# Patient Record
Sex: FEMALE | Race: WHITE | NOT HISPANIC OR LATINO | Employment: FULL TIME | URBAN - METROPOLITAN AREA
[De-identification: names, ages, dates, MRNs, and addresses within clinical notes are randomized per-mention and may not be internally consistent; named-entity substitution may affect disease eponyms.]

---

## 2024-05-06 NOTE — PROGRESS NOTES
"Chief Complaint  Establish Care    Subjective        HPI   Becka presents to [facility] for a new pt visit. Works in [employer].     Diabetes screening: Today  Lipid screening: Today  Lung cancer screening: Never smoker  Colon cancer screening: Referred for C-scope by gyn, has been putting off. Nervous about procedure but is open to referral. Would prefer that over cologuard.   Breast cancer screening: Gyn at Dr. Martell Bhatt  Cervical cancer screening: Gyn at Dr. Martell Bhatt  On HRT per her gyn  Bone density screening: Age 67  Discussed limiting or avoidance of alcohol  Does not use tobacco  Discussed recommendation for yearly eye and dental exams  Vaccines: States she RSV vaccine last fall although I cannot find record of it.   Diet and exercise habits discussed  FH: No FH cardiac disease  Mother had breast cancer, cervical cancer  No colon cancer in the family    Separate from physical/wellness visit, we discussed the following:    Issues with Oxybutynin, severe dry mouth to the point it was painful  Tried some sort of dry mouth remedy but did not help  Myrbetric also causing dry mouth, tolerable  However, she then started having diarrhea and acid reflux on it  Stopped taking it, acid reflux went away, stopped it about a week ago  Gyn was prescribing this for urge urinary incontinence, has daily sxs, huge annoyance  Never has seen urogyn    GERD sxs: Reflux sxs off PPI  No dysphagia  2-3 drinks of wine per day  No NSAIDs    Requesting PT referral for knee issues, getting a little worse lately, no injury, more stiffness  Would like an exercise program  Does not have an orthopedist currently  Knows she has OA  Walks regularly      Objective   Vital Signs:  Vitals:    05/13/24 1126   BP: 132/82   BP Location: Left arm   Patient Position: Sitting   Cuff Size: Adult   Pulse: 90   SpO2: 99%   Weight: 84.2 kg (185 lb 9.6 oz)   Height: 172.7 cm (68\")          Physical " Exam  Constitutional:       General: She is not in acute distress.     Appearance: Normal appearance. She is not ill-appearing or toxic-appearing.   HENT:      Head: Normocephalic and atraumatic.      Right Ear: Tympanic membrane and ear canal normal.      Left Ear: Tympanic membrane and ear canal normal.      Mouth/Throat:      Mouth: Mucous membranes are moist.      Pharynx: Oropharynx is clear. No oropharyngeal exudate or posterior oropharyngeal erythema.   Eyes:      General: No scleral icterus.        Right eye: No discharge.         Left eye: No discharge.      Conjunctiva/sclera: Conjunctivae normal.   Cardiovascular:      Rate and Rhythm: Normal rate and regular rhythm.      Heart sounds: Normal heart sounds. No murmur heard.     No friction rub. No gallop.   Pulmonary:      Effort: Pulmonary effort is normal. No respiratory distress.      Breath sounds: Normal breath sounds. No wheezing or rhonchi.   Musculoskeletal:         General: No swelling, tenderness or deformity. Normal range of motion.      Cervical back: Normal range of motion and neck supple. No rigidity.   Lymphadenopathy:      Cervical: No cervical adenopathy.   Skin:     General: Skin is warm and dry.      Coloration: Skin is not jaundiced or pale.      Findings: No lesion or rash.   Neurological:      General: No focal deficit present.      Mental Status: She is alert and oriented to person, place, and time.   Psychiatric:         Mood and Affect: Mood normal.         Behavior: Behavior normal.         Judgment: Judgment normal.          Result Review :     The following data was reviewed by: Maria Luz Del Toro MD on 05/13/2024:  MAMMO SCREENING DIGITAL TOMOSYNTHESIS BILATERAL W CAD (10/25/2022 09:53)  DXA Bone Density Screening (11/17/2022 17:13)         Assessment and Plan    Diagnoses and all orders for this visit:    1. Annual physical exam (Primary)    2. Routine health maintenance  Assessment & Plan:  Diabetes screening: Today  Lipid  screening: Today  Lung cancer screening: Never smoker  Colon cancer screening: Referred for C-scope by gyn, has been putting off. Nervous about procedure but is open to referral. Would prefer C-scope over Cologuard.   Breast cancer screening: Gyn at Dr. Martell Bhatt  Cervical cancer screening: Gyn at Dr. Martell Bhatt  Bone density screening: Age 67  Discussed limiting or avoidance of alcohol  Does not use tobacco  Discussed recommendation for yearly eye and dental exams  Vaccines: States she RSV vaccine last fall although I cannot find record of it. Otherwise vaccines are UTD  FH: No FH cardiac disease  Mother had breast cancer, cervical cancer  No colon cancer in the family    Orders:  -     CBC (No Diff)  -     Comprehensive Metabolic Panel    3. Encounter for hepatitis C screening test for low risk patient  -     Hepatitis C Antibody    4. Screening for diabetes mellitus (DM)  -     Hemoglobin A1c    5. Screening for lipid disorders  -     Lipid Panel    6. Gastroesophageal reflux disease, unspecified whether esophagitis present  Assessment & Plan:  CBC today  Con't PPI as breakthrough sxs off PPI  No NSAIDs  Advised that cutting down on ETOH would likely be very helpful    Orders:  -     CBC (No Diff)    7. Primary hypertension  Assessment & Plan:  On Lisinopril, controlled  Check renal function and K today    Orders:  -     Comprehensive Metabolic Panel    8. Other hyperlipidemia  Assessment & Plan:  Lipid panel today  ASCVD 10 year risk 5.1% based on last set of labs  No FH ASCVD as far as pt knows    Orders:  -     Lipid Panel    9. Urge incontinence  Assessment & Plan:  Huge QOL issue for pt  Unable to tolerate Oxybutynin and Mybetriq due to side effects  Recommended urogyn consult for discussion of non-medication options to manage    Orders:  -     Ambulatory Referral to Gynecologic Urology    10. Screening for colon cancer  -     Ambulatory Referral For Screening Colonoscopy    11. Chronic pain of  right knee  Assessment & Plan:  States she has known OA, pain not debilitating to point she feels she needs ortho but would like to see PT. Referred. Declines x-ray today.     Orders:  -     Ambulatory Referral to Physical Therapy    12. Daily consumption of alcohol  Assessment & Plan:  Denies any adverse outcomes from this, no hx withdrawal, very much enjoys wine  Counseled pt that per USPSTF guidelines, recommendation is no more than 7 alcoholic drinks per week for women  Offered her resources for cutting down if she ever feels she needs them (does not feel she needs now)  CMP today      13. Seasonal allergies  Assessment & Plan:  Flonase and Zyrtec          Follow Up   Return in about 6 months (around 11/13/2024) for Recheck, Next scheduled follow up.  Patient was given instructions and counseling regarding her condition or for health maintenance advice. Please see specific information pulled into the AVS if appropriate.

## 2024-05-13 ENCOUNTER — OFFICE VISIT (OUTPATIENT)
Dept: FAMILY MEDICINE CLINIC | Facility: CLINIC | Age: 64
End: 2024-05-13
Payer: COMMERCIAL

## 2024-05-13 ENCOUNTER — PATIENT ROUNDING (BHMG ONLY) (OUTPATIENT)
Dept: FAMILY MEDICINE CLINIC | Facility: CLINIC | Age: 64
End: 2024-05-13
Payer: COMMERCIAL

## 2024-05-13 VITALS
OXYGEN SATURATION: 99 % | HEART RATE: 90 BPM | HEIGHT: 68 IN | DIASTOLIC BLOOD PRESSURE: 82 MMHG | BODY MASS INDEX: 28.13 KG/M2 | SYSTOLIC BLOOD PRESSURE: 132 MMHG | WEIGHT: 185.6 LBS

## 2024-05-13 DIAGNOSIS — Z13.220 SCREENING FOR LIPID DISORDERS: ICD-10-CM

## 2024-05-13 DIAGNOSIS — Z11.59 ENCOUNTER FOR HEPATITIS C SCREENING TEST FOR LOW RISK PATIENT: ICD-10-CM

## 2024-05-13 DIAGNOSIS — K21.9 GASTROESOPHAGEAL REFLUX DISEASE, UNSPECIFIED WHETHER ESOPHAGITIS PRESENT: ICD-10-CM

## 2024-05-13 DIAGNOSIS — J30.2 SEASONAL ALLERGIES: ICD-10-CM

## 2024-05-13 DIAGNOSIS — G89.29 CHRONIC PAIN OF RIGHT KNEE: ICD-10-CM

## 2024-05-13 DIAGNOSIS — Z00.00 ANNUAL PHYSICAL EXAM: Primary | ICD-10-CM

## 2024-05-13 DIAGNOSIS — M25.561 CHRONIC PAIN OF RIGHT KNEE: ICD-10-CM

## 2024-05-13 DIAGNOSIS — N39.41 URGE INCONTINENCE: ICD-10-CM

## 2024-05-13 DIAGNOSIS — Z00.00 ROUTINE HEALTH MAINTENANCE: ICD-10-CM

## 2024-05-13 DIAGNOSIS — E78.49 OTHER HYPERLIPIDEMIA: ICD-10-CM

## 2024-05-13 DIAGNOSIS — Z13.1 SCREENING FOR DIABETES MELLITUS (DM): ICD-10-CM

## 2024-05-13 DIAGNOSIS — Z78.9 DAILY CONSUMPTION OF ALCOHOL: ICD-10-CM

## 2024-05-13 DIAGNOSIS — Z12.11 SCREENING FOR COLON CANCER: ICD-10-CM

## 2024-05-13 DIAGNOSIS — I10 PRIMARY HYPERTENSION: ICD-10-CM

## 2024-05-13 PROCEDURE — 99214 OFFICE O/P EST MOD 30 MIN: CPT | Performed by: INTERNAL MEDICINE

## 2024-05-13 PROCEDURE — 99396 PREV VISIT EST AGE 40-64: CPT | Performed by: INTERNAL MEDICINE

## 2024-05-13 RX ORDER — CETIRIZINE HYDROCHLORIDE 10 MG/1
10 TABLET ORAL DAILY
COMMUNITY

## 2024-05-13 RX ORDER — FLUTICASONE PROPIONATE 50 MCG
2 SPRAY, SUSPENSION (ML) NASAL DAILY
COMMUNITY

## 2024-05-13 RX ORDER — CYCLOSPORINE 0.5 MG/ML
EMULSION OPHTHALMIC
COMMUNITY
Start: 2024-03-20

## 2024-05-13 RX ORDER — MIRABEGRON 25 MG/1
TABLET, FILM COATED, EXTENDED RELEASE ORAL
COMMUNITY
Start: 2024-04-01 | End: 2024-05-13

## 2024-05-13 NOTE — ASSESSMENT & PLAN NOTE
CBC today  Con't PPI as breakthrough sxs off PPI  No NSAIDs  Advised that cutting down on ETOH would likely be very helpful

## 2024-05-13 NOTE — ASSESSMENT & PLAN NOTE
Lipid panel today  ASCVD 10 year risk 5.1% based on last set of labs  No FH ASCVD as far as pt knows

## 2024-05-13 NOTE — ASSESSMENT & PLAN NOTE
Diabetes screening: Today  Lipid screening: Today  Lung cancer screening: Never smoker  Colon cancer screening: Referred for C-scope by gyn, has been putting off. Nervous about procedure but is open to referral. Would prefer C-scope over Cologuard.   Breast cancer screening: Gyn at Dr. Martell Bhatt  Cervical cancer screening: Gyn at Dr. Martell Bhatt  Bone density screening: Age 67  Discussed limiting or avoidance of alcohol  Does not use tobacco  Discussed recommendation for yearly eye and dental exams  Vaccines: States she RSV vaccine last fall although I cannot find record of it. Otherwise vaccines are UTD  FH: No FH cardiac disease  Mother had breast cancer, cervical cancer  No colon cancer in the family

## 2024-05-13 NOTE — ASSESSMENT & PLAN NOTE
States she has known OA, pain not debilitating to point she feels she needs ortho but would like to see PT. Referred. Declines x-ray today.

## 2024-05-13 NOTE — ASSESSMENT & PLAN NOTE
Huge QOL issue for pt  Unable to tolerate Oxybutynin and Mybetriq due to side effects  Recommended urogyn consult for discussion of non-medication options to manage   Patient notified of negative results. Will use coconut oil for dryness in that area.

## 2024-05-13 NOTE — ASSESSMENT & PLAN NOTE
Denies any adverse outcomes from this, no hx withdrawal, very much enjoys wine  Counseled pt that per USPSTF guidelines, recommendation is no more than 7 alcoholic drinks per week for women  Offered her resources for cutting down if she ever feels she needs them (does not feel she needs now)  CMP today

## 2024-05-13 NOTE — PROGRESS NOTES
A My-Chart message has been sent to the patient for PATIENT ROUNDING with [facility]

## 2024-05-14 DIAGNOSIS — I10 PRIMARY HYPERTENSION: Primary | ICD-10-CM

## 2024-05-14 DIAGNOSIS — K21.9 GASTROESOPHAGEAL REFLUX DISEASE, UNSPECIFIED WHETHER ESOPHAGITIS PRESENT: ICD-10-CM

## 2024-05-14 LAB
ALBUMIN SERPL-MCNC: 4.4 G/DL (ref 3.5–5.2)
ALBUMIN/GLOB SERPL: 1.7 G/DL
ALP SERPL-CCNC: 68 U/L (ref 39–117)
ALT SERPL-CCNC: 19 U/L (ref 1–33)
AST SERPL-CCNC: 25 U/L (ref 1–32)
BILIRUB SERPL-MCNC: 0.2 MG/DL (ref 0–1.2)
BUN SERPL-MCNC: 10 MG/DL (ref 8–23)
BUN/CREAT SERPL: 12.2 (ref 7–25)
CALCIUM SERPL-MCNC: 9.4 MG/DL (ref 8.6–10.5)
CHLORIDE SERPL-SCNC: 105 MMOL/L (ref 98–107)
CHOLEST SERPL-MCNC: 180 MG/DL (ref 0–200)
CO2 SERPL-SCNC: 27.9 MMOL/L (ref 22–29)
CREAT SERPL-MCNC: 0.82 MG/DL (ref 0.57–1)
EGFRCR SERPLBLD CKD-EPI 2021: 81.5 ML/MIN/1.73
ERYTHROCYTE [DISTWIDTH] IN BLOOD BY AUTOMATED COUNT: 12.8 % (ref 12.3–15.4)
GLOBULIN SER CALC-MCNC: 2.6 GM/DL
GLUCOSE SERPL-MCNC: 94 MG/DL (ref 65–99)
HBA1C MFR BLD: 5.4 % (ref 4.8–5.6)
HCT VFR BLD AUTO: 41.6 % (ref 34–46.6)
HCV IGG SERPL QL IA: NON REACTIVE
HDLC SERPL-MCNC: 55 MG/DL (ref 40–60)
HGB BLD-MCNC: 13.3 G/DL (ref 12–15.9)
LDLC SERPL CALC-MCNC: 101 MG/DL (ref 0–100)
MCH RBC QN AUTO: 29.8 PG (ref 26.6–33)
MCHC RBC AUTO-ENTMCNC: 32 G/DL (ref 31.5–35.7)
MCV RBC AUTO: 93.3 FL (ref 79–97)
PLATELET # BLD AUTO: 290 10*3/MM3 (ref 140–450)
POTASSIUM SERPL-SCNC: 4.4 MMOL/L (ref 3.5–5.2)
PROT SERPL-MCNC: 7 G/DL (ref 6–8.5)
RBC # BLD AUTO: 4.46 10*6/MM3 (ref 3.77–5.28)
SODIUM SERPL-SCNC: 144 MMOL/L (ref 136–145)
TRIGL SERPL-MCNC: 139 MG/DL (ref 0–150)
VLDLC SERPL CALC-MCNC: 24 MG/DL (ref 5–40)
WBC # BLD AUTO: 6.08 10*3/MM3 (ref 3.4–10.8)

## 2024-05-14 RX ORDER — LISINOPRIL 20 MG/1
20 TABLET ORAL DAILY
Qty: 90 TABLET | Refills: 3 | Status: SHIPPED | OUTPATIENT
Start: 2024-05-14

## 2024-05-14 RX ORDER — PANTOPRAZOLE SODIUM 20 MG/1
20 TABLET, DELAYED RELEASE ORAL DAILY
Qty: 90 TABLET | Refills: 3 | Status: SHIPPED | OUTPATIENT
Start: 2024-05-14

## 2024-06-05 ENCOUNTER — TREATMENT (OUTPATIENT)
Dept: PHYSICAL THERAPY | Facility: CLINIC | Age: 64
End: 2024-06-05
Payer: COMMERCIAL

## 2024-06-05 DIAGNOSIS — G89.29 CHRONIC PAIN OF RIGHT KNEE: Primary | ICD-10-CM

## 2024-06-05 DIAGNOSIS — M25.561 CHRONIC PAIN OF RIGHT KNEE: Primary | ICD-10-CM

## 2024-06-05 NOTE — PROGRESS NOTES
Physical Therapy Initial Evaluation and Plan of Care  [address], [address]  [place] [address]    Patient: Becka Stack   : 1960  Diagnosis/ICD-10 Code:  Chronic pain of right knee [M25.561, G89.29]  Referring practitioner: Maria Luz Del Toro MD  Date of Initial Visit: 2024  Today's Date: 2024  Patient seen for 1 session         Visit Diagnoses:    ICD-10-CM ICD-9-CM   1. Chronic pain of right knee  M25.561 719.46    G89.29 338.29         Subjective Questionnaire: LEFS: 53      Subjective Evaluation    History of Present Illness  Mechanism of injury: Patient is a 64 year old female who presents with right knee pain that has been bothering her since she was 21.  Reports that over the years it has gotten worse.  Denies any recent injuries to the knee.  Reports more stiffness in the knee.  Reports having PT quite a while ago.  Reports trying to walk a lot, which helps the pain in the knee.    Reports having difficulty with stairs.  Reports being unable to kneel and get up.  Denies any issues sleeping.    History of right knee scope in .      Patient Occupation: [employer]  Current pain ratin  At worst pain ratin  Location: right anterior knee  Quality: dull ache (stiffness)  Alleviating factors: walking.  Exacerbated by: sitting long periods.  Progression: worsening    Diagnostic Tests  X-ray: abnormal (arthritis)             Objective          Observations     Additional Knee Observation Details  Normal sit to stand.  Patient ambulates with a minimal antalgic gait pattern.    Palpation     Additional Palpation Details  TTP to the right medial joint line, inferior patella and superior medial patella.    Active Range of Motion     Right Knee   Flexion: 126 degrees   Extension: Right knee active extension: -15.     Strength/Myotome Testing     Left Knee   Flexion: 4+  Extension: 4+    Tests     Right Knee   Negative valgus stress test at 0 degrees, valgus stress test  at 30 degrees, varus stress test at 0 degrees and varus stress test at 30 degrees.           Assessment & Plan       Assessment  Impairments: abnormal gait, abnormal or restricted ROM, activity intolerance, lacks appropriate home exercise program and pain with function   Assessment details: Patient is a 64 year old female who presents with c/o pain, TTP, limited right knee AROM and an antalgic gait pattern.  Barriers to therapy: none  Prognosis: good  Prognosis details: STG's to be met by 2 weeks  1)  Independent with HEP to show compliance  2)  Decrease pain by 50% or more to allow patient to perform self care and activities more comfortably  3)  AROM right knee flexion to 130 for improved mobility with gait and stairs  4)  AROM right knee extension to -8 for improved heel strike during the gait cycle    LTG's to be met by 4 weeks  1)  Independent with HEP progression to show continued compliance  2)  Decrease pain by 75% or more to allow patient to return to activities and hobbies with minimal limitations   3)  No TTP present to indicate improved healing response  4)  Patient to ambulate with a normal gait pattern to allow for improved mobility in the house, stores, etc      Plan  Therapy options: will be seen for skilled therapy services  Planned therapy interventions: strengthening, stretching, therapeutic activities, home exercise program, gait training and neuromuscular re-education  Frequency: 2x week  Duration in weeks: 4  Treatment plan discussed with: patient            Timed:         Manual Therapy:    0     mins  63728;     Therapeutic Exercise:    18     mins  97763;     Neuromuscular Chelsea:    0    mins  96079;    Therapeutic Activity:     8     mins  14345;     Gait Trainin     mins  69883;     Ultrasound:     0     mins  01854;          Un-Timed:  Electrical Stimulation:    0     mins  78181 ( );    Low Eval     20     Mins  39256  Mod Eval     0     Mins  20709  High Eval                        0     Mins  89163        Timed Treatment:   26   mins   Total Treatment:     46   mins          PT: Carlos Eduardo Mcfarlane, PT     [place] License [id number]  Electronically signed by Carlos Eduardo Mcfarlane PT, 06/05/24, 3:13 PM EDT    Certification Period: 6/5/2024 thru 9/2/2024  I certify that the therapy services are furnished while this patient is under my care.  The services outlined above are required by this patient, and will be reviewed every 90 days.    Maria Luz Elizondo Md  [facility] [place] [address]   NPI: [id number]      Carlos Eduardo Mcfarlane PT   License number: [id number]        Physician Signature:__________________________________________________    PHYSICIAN: Maria Luz Elizondo MD      DATE:     Please sign and return via fax to [email or web address] . Thank you, [facility].

## 2024-06-19 ENCOUNTER — TREATMENT (OUTPATIENT)
Dept: PHYSICAL THERAPY | Facility: CLINIC | Age: 64
End: 2024-06-19
Payer: COMMERCIAL

## 2024-06-19 DIAGNOSIS — G89.29 CHRONIC PAIN OF RIGHT KNEE: Primary | ICD-10-CM

## 2024-06-19 DIAGNOSIS — M25.561 CHRONIC PAIN OF RIGHT KNEE: Primary | ICD-10-CM

## 2024-06-19 NOTE — PROGRESS NOTES
Physical Therapy Daily Treatment Note  [address], [address]  [place] [address]      Patient: Becka Stack   : 1960  Referring practitioner: Maria Luz Del Toro MD  Date of Initial Visit: Type: THERAPY  Noted: 2024  Today's Date: 2024  Patient seen for 2 sessions       Visit Diagnoses:    ICD-10-CM ICD-9-CM   1. Chronic pain of right knee  M25.561 719.46    G89.29 338.29           Subjective   Patient reports that she was doing pretty good until she tried the elliptical, which aggravated it.  Currently rates the pain at 2/10.    Objective   See Exercise, Manual, and Modality Logs for complete treatment.   Added NuStep and bridges.    Assessment/Plan  Subjective reports are slightly increased from the previous visit (no pain).  Added NuStep for strength and endurance.  Added bridges for glut and hamstring strength which will help going up stairs.  Added KT to the knee to help the S/S's.  Patient tolerated the progression of open and closed chain activities very well, no reports of pain with the routine.      Timed:         Manual Therapy:    0     mins  14248;     Therapeutic Exercise:    16     mins  09184;     Neuromuscular Chelsea:    8    mins  92051;    Therapeutic Activity:     8     mins  99989;     Gait Training      0    mins  98394;  Work Conditioning     0   mins  93261       Untimed:  Electrical Stimulation:    0     mins  48601 ( );      Timed Treatment:   32   mins   Total Treatment:     32   mins    Carlos Eduardo Mcfarlane, PT  [place] License: [id number]

## 2024-06-25 ENCOUNTER — TREATMENT (OUTPATIENT)
Dept: PHYSICAL THERAPY | Facility: CLINIC | Age: 64
End: 2024-06-25
Payer: COMMERCIAL

## 2024-06-25 DIAGNOSIS — G89.29 CHRONIC PAIN OF RIGHT KNEE: Primary | ICD-10-CM

## 2024-06-25 DIAGNOSIS — M25.561 CHRONIC PAIN OF RIGHT KNEE: Primary | ICD-10-CM

## 2024-06-25 NOTE — PROGRESS NOTES
Physical Therapy Daily Treatment Note  [facility]   [address], [address]  [place] [address]  P: [phone]       F: [phone]    Patient: Becka Stack   : 1960  Diagnosis/ICD-10 Code:  Chronic pain of right knee [M25.561, G89.29]  Referring practitioner: Maria Luz Del Toro MD  Date of Initial Visit: Type: THERAPY  Noted: 2024  Today's Date: 2024  Patient seen for 3 sessions       Becka Stack reports: R knee is better today versus last week. I didn't have anything at home to take the tape off so it is still on. I think I want to go a week and see how I do without the tape.     Subjective     Objective   See Exercise, Manual, and Modality Logs for complete treatment.       Assessment/Plan  Subjectively, pt reports no increase of pain or discomfort with interventions performed today. Performed well with continued R knee mobility and strengthening interventions. Continues to demonstrate minimal tolerance to added CKC strengthening with squats with fear of the exercise. Continues to benefit from verbal/tactile cues to ensure proper form and technique for exercise performance.     Progress per Plan of Care           Manual Therapy:         mins  69570;  Therapeutic Exercise:    15     mins  82989;     Neuromuscular Chelsea:    10    mins  98075;    Therapeutic Activity:     10     mins  61810;     Gait Training:           mins  34656;     Ultrasound:          mins  36872;    Electrical Stimulation:         mins  59329;  Traction          mins 02499    Timed Treatment:   35   mins   Total Treatment:     35   mins    Taylor Haji PTA  Physical Therapist Assistant [id number]

## 2024-07-15 ENCOUNTER — TREATMENT (OUTPATIENT)
Dept: PHYSICAL THERAPY | Facility: CLINIC | Age: 64
End: 2024-07-15
Payer: COMMERCIAL

## 2024-07-15 ENCOUNTER — DOCUMENTATION (OUTPATIENT)
Dept: PHYSICAL THERAPY | Facility: CLINIC | Age: 64
End: 2024-07-15

## 2024-07-15 DIAGNOSIS — G89.29 CHRONIC PAIN OF RIGHT KNEE: Primary | ICD-10-CM

## 2024-07-15 DIAGNOSIS — M25.561 CHRONIC PAIN OF RIGHT KNEE: Primary | ICD-10-CM

## 2024-07-15 NOTE — PROGRESS NOTES
Physical Therapy Daily Treatment Note  [address], [address]  [place] [address]      Patient: Becka Stack   : 1960  Referring practitioner: Maria Luz Del Toro MD  Date of Initial Visit: Type: THERAPY  Noted: 2024  Today's Date: 7/15/2024  Patient seen for 4 sessions       Visit Diagnoses:    ICD-10-CM ICD-9-CM   1. Chronic pain of right knee  M25.561 719.46    G89.29 338.29           Subjective   Patient reports that the knee has been doing good; currently denies pain.    Objective          Active Range of Motion     Right Knee   Flexion: 125 degrees   Extension: Right knee active extension: -16.     Strength/Myotome Testing     Right Knee   Flexion: 5  Extension: 4+      See Exercise, Manual, and Modality Logs for complete treatment.       Assessment/Plan  Subjective reports have improved since beginning PT.  Strength has improved since starting PT.  Feel that the patient can continue with the HEP at this time and she was agreeable with this.  Patient performed the exercise routine without visual or verbal signs of pain in the knee.      Timed:         Manual Therapy:    0     mins  29765;     Therapeutic Exercise:    25     mins  91182;     Neuromuscular Chelsea:    0    mins  33606;    Therapeutic Activity:     8     mins  92054;     Gait Training      0    mins  42700;  Work Conditioning     0   mins  18065       Untimed:  Electrical Stimulation:    0     mins  23821 ( );      Timed Treatment:   33   mins   Total Treatment:     33   mins    Carlos Eduardo Mcfarlane, PT  [place] License: [id number]

## 2024-10-29 ENCOUNTER — PREP FOR SURGERY (OUTPATIENT)
Dept: OTHER | Facility: HOSPITAL | Age: 64
End: 2024-10-29
Payer: COMMERCIAL

## 2024-10-29 DIAGNOSIS — Z12.11 SCREENING FOR COLON CANCER: Primary | ICD-10-CM

## 2024-11-13 ENCOUNTER — OFFICE VISIT (OUTPATIENT)
Dept: FAMILY MEDICINE CLINIC | Facility: CLINIC | Age: 64
End: 2024-11-13
Payer: COMMERCIAL

## 2024-11-13 VITALS
BODY MASS INDEX: 27.74 KG/M2 | SYSTOLIC BLOOD PRESSURE: 146 MMHG | DIASTOLIC BLOOD PRESSURE: 94 MMHG | WEIGHT: 183 LBS | HEART RATE: 88 BPM | TEMPERATURE: 98.3 F | RESPIRATION RATE: 16 BRPM | HEIGHT: 68 IN | OXYGEN SATURATION: 98 %

## 2024-11-13 DIAGNOSIS — I10 PRIMARY HYPERTENSION: Primary | ICD-10-CM

## 2024-11-13 DIAGNOSIS — K21.9 GASTROESOPHAGEAL REFLUX DISEASE, UNSPECIFIED WHETHER ESOPHAGITIS PRESENT: ICD-10-CM

## 2024-11-13 DIAGNOSIS — R73.09 ABNORMAL GLUCOSE: ICD-10-CM

## 2024-11-13 DIAGNOSIS — Z00.00 ANNUAL PHYSICAL EXAM: ICD-10-CM

## 2024-11-13 DIAGNOSIS — E78.49 OTHER HYPERLIPIDEMIA: ICD-10-CM

## 2024-11-13 PROCEDURE — 99214 OFFICE O/P EST MOD 30 MIN: CPT | Performed by: STUDENT IN AN ORGANIZED HEALTH CARE EDUCATION/TRAINING PROGRAM

## 2024-11-13 RX ORDER — TRIAMCINOLONE ACETONIDE 5 MG/G
CREAM TOPICAL
COMMUNITY
End: 2024-11-13

## 2024-11-13 RX ORDER — VIBEGRON 75 MG/1
75 TABLET, FILM COATED ORAL DAILY
COMMUNITY
Start: 2024-06-05 | End: 2024-12-30

## 2024-11-13 RX ORDER — PANTOPRAZOLE SODIUM 40 MG/1
40 TABLET, DELAYED RELEASE ORAL DAILY
Qty: 90 TABLET | Refills: 3 | Status: SHIPPED | OUTPATIENT
Start: 2024-11-13

## 2024-11-13 RX ORDER — LISINOPRIL 20 MG/1
20 TABLET ORAL DAILY
Qty: 90 TABLET | Refills: 3 | Status: SHIPPED | OUTPATIENT
Start: 2024-11-13

## 2024-11-19 ENCOUNTER — PATIENT MESSAGE (OUTPATIENT)
Dept: FAMILY MEDICINE CLINIC | Facility: CLINIC | Age: 64
End: 2024-11-19
Payer: COMMERCIAL

## 2024-11-19 LAB
ALBUMIN SERPL-MCNC: 4.3 G/DL (ref 3.5–5.2)
ALBUMIN/GLOB SERPL: 1.5 G/DL
ALP SERPL-CCNC: 59 U/L (ref 39–117)
ALT SERPL-CCNC: 13 U/L (ref 1–33)
AST SERPL-CCNC: 22 U/L (ref 1–32)
BILIRUB SERPL-MCNC: 0.4 MG/DL (ref 0–1.2)
BUN SERPL-MCNC: 14 MG/DL (ref 8–23)
BUN/CREAT SERPL: 17.7 (ref 7–25)
CALCIUM SERPL-MCNC: 9.3 MG/DL (ref 8.6–10.5)
CHLORIDE SERPL-SCNC: 105 MMOL/L (ref 98–107)
CHOLEST SERPL-MCNC: 222 MG/DL (ref 0–200)
CHOLEST/HDLC SERPL: 3.31 {RATIO}
CO2 SERPL-SCNC: 26.1 MMOL/L (ref 22–29)
CREAT SERPL-MCNC: 0.79 MG/DL (ref 0.57–1)
EGFRCR SERPLBLD CKD-EPI 2021: 84.7 ML/MIN/1.73
GLOBULIN SER CALC-MCNC: 2.9 GM/DL
GLUCOSE SERPL-MCNC: 90 MG/DL (ref 65–99)
HDLC SERPL-MCNC: 67 MG/DL (ref 40–60)
LDLC SERPL CALC-MCNC: 131 MG/DL (ref 0–100)
POTASSIUM SERPL-SCNC: 4.7 MMOL/L (ref 3.5–5.2)
PROT SERPL-MCNC: 7.2 G/DL (ref 6–8.5)
SODIUM SERPL-SCNC: 140 MMOL/L (ref 136–145)
TRIGL SERPL-MCNC: 135 MG/DL (ref 0–150)
VLDLC SERPL CALC-MCNC: 24 MG/DL (ref 5–40)

## 2024-11-19 NOTE — PROGRESS NOTES
"Chief Complaint  Establish Care, Hypertension (LEFT, 146/94.), Heartburn, and Arthritis    Subjective    History of Present Illness  The patient is a 64-year-old female who is here as a new patient for establishing care.    She has a history of hypertension, which was previously managed with lisinopril. Her blood pressure was well-controlled during her last visit in 05/2024. However, she does not regularly monitor her blood pressure at home and has not done so in the past two weeks.    She also has a history of cholesterol issues but is not currently on any medication for it.    She has experienced urge incontinence and was referred to a urogynecologist, Dr. Rosamaria Pérez, who recommended a different medication that has been more effective. She is currently on Gemtesa. She has undergone a bladder scan, urine testing, culture, and vaginosis panel. A vaginal swab was also taken.    She had a biopsy of the inner lip of her vulvar area, which confirmed a diagnosis of lichen sclerosis with hyperkeratosis. This condition has improved significantly.    She reports no nausea, vomiting, abdominal pain, or leg swelling.       Becka Stack presents to [facility]  Hypertension    Heartburn    Arthritis        Objective   Vital Signs:  /94 (BP Location: Left arm, Patient Position: Sitting, Cuff Size: Adult)   Pulse 88   Temp 98.3 °F (36.8 °C) (Oral)   Resp 16   Ht 172.7 cm (68\")   Wt 83 kg (183 lb)   SpO2 98%   BMI 27.83 kg/m²   Estimated body mass index is 27.83 kg/m² as calculated from the following:    Height as of this encounter: 172.7 cm (68\").    Weight as of this encounter: 83 kg (183 lb).            Physical Exam  Cardiovascular:      Rate and Rhythm: Normal rate.      Pulses: Normal pulses.      Heart sounds: Normal heart sounds.   Pulmonary:      Effort: Pulmonary effort is normal.      Breath sounds: Normal breath sounds.   Abdominal:      General: Bowel sounds are normal. "      Palpations: Abdomen is soft.   Skin:     General: Skin is warm.   Neurological:      Mental Status: She is alert and oriented to person, place, and time.        Result Review :  The following data was reviewed by: Arnav Wolff MD on 11/13/2024:  CMP          5/13/2024    12:19 11/18/2024    09:41   CMP   Glucose 94  90    BUN 10  14    Creatinine 0.82  0.79    Sodium 144  140    Potassium 4.4  4.7    Chloride 105  105    Calcium 9.4  9.3    Total Protein 7.0  7.2    Albumin 4.4  4.3    Globulin 2.6  2.9    Total Bilirubin 0.2  0.4    Alkaline Phosphatase 68  59    AST (SGOT) 25  22    ALT (SGPT) 19  13    BUN/Creatinine Ratio 12.2  17.7      CBC          5/13/2024    12:19   CBC   WBC 6.08    RBC 4.46    Hemoglobin 13.3    Hematocrit 41.6    MCV 93.3    MCH 29.8    MCHC 32.0    RDW 12.8    Platelets 290      Lipid Panel          5/13/2024    12:19 11/18/2024    09:41   Lipid Panel   Total Cholesterol 180  222    Triglycerides 139  135    HDL Cholesterol 55  67    VLDL Cholesterol 24  24    LDL Cholesterol  101  131                  Assessment and Plan   Diagnoses and all orders for this visit:    1. Primary hypertension (Primary)  -     Comprehensive Metabolic Panel  -     Comprehensive Metabolic Panel; Future  -     lisinopril (PRINIVIL,ZESTRIL) 20 MG tablet; Take 1 tablet by mouth Daily.  Dispense: 90 tablet; Refill: 3    2. Other hyperlipidemia  -     Lipid Panel With / Chol / HDL Ratio  -     Lipid Panel With / Chol / HDL Ratio; Future    3. Abnormal glucose  -     Hemoglobin A1c; Future    4. Annual physical exam  -     CBC Auto Differential; Future  -     Comprehensive Metabolic Panel; Future  -     Lipid Panel With / Chol / HDL Ratio; Future  -     TSH; Future  -     Urinalysis With Microscopic - Urine, Clean Catch; Future  -     Hemoglobin A1c; Future    5. Gastroesophageal reflux disease, unspecified whether esophagitis present  -     pantoprazole (Protonix) 40 MG EC tablet; Take 1 tablet by mouth  Daily.  Dispense: 90 tablet; Refill: 3        Assessment & Plan  1. Hypertension.  Her previous visit in May 2024 with Dr. Del Toro revealed controlled hypertension with a blood pressure reading of 132/82. However, today's reading indicates an elevated blood pressure of 146/94. She does not regularly check her blood pressure at home. It is not advisable to adjust her blood pressure medication based on a single reading. She will need to monitor her blood pressure readings at home for 4 weeks. If her readings are consistently below 135/85, she can continue her current medication regimen and schedule her next appointment in 6 months. If her readings are high, she will need to schedule an earlier appointment. A new prescription for lisinopril 20 mg will be sent to her pharmacy, [facility].    2. Hyperlipidemia.  She has a history of cholesterol issues with a risk factor of 5.1 percent. Her cholesterol levels were within normal range six months ago. Today's lab tests will focus on liver, kidney function, and cholesterol levels. She was not fasting today, which may affect her glucose and triglyceride levels. It is important to monitor her cholesterol levels due to her slightly elevated cholesterol. She is advised to schedule fasting labs within a month.    3. Urge Incontinence.  She has been managing urge incontinence with Gemtesa, which has shown improvement. Her skin condition has also improved since she stopped wearing pads and started using Aquaphor. A biopsy confirmed lichen sclerosus with hyperkeratosis, but her condition has improved significantly. She will continue with her current treatment.    4. Gastroesophageal Reflux Disease (GERD).  She requested a new prescription for pantoprazole 40 mg, which will be sent to her pharmacy, [facility].    Follow-up  Return in 6 months for follow-up.            Follow Up   No follow-ups on file.  Patient was given instructions and counseling regarding her condition or for health  maintenance advice. Please see specific information pulled into the AVS if appropriate.     Patient or patient representative verbalized consent for the use of Ambient Listening during the visit with  Arnav Wolff MD for chart documentation. 11/19/2024  04:57 EST

## 2025-06-13 ENCOUNTER — OFFICE VISIT (OUTPATIENT)
Dept: FAMILY MEDICINE CLINIC | Facility: CLINIC | Age: 65
End: 2025-06-13
Payer: COMMERCIAL

## 2025-06-13 VITALS
OXYGEN SATURATION: 97 % | DIASTOLIC BLOOD PRESSURE: 98 MMHG | SYSTOLIC BLOOD PRESSURE: 142 MMHG | HEART RATE: 88 BPM | TEMPERATURE: 98.4 F | RESPIRATION RATE: 16 BRPM | BODY MASS INDEX: 28.95 KG/M2 | HEIGHT: 68 IN | WEIGHT: 191 LBS

## 2025-06-13 DIAGNOSIS — E78.49 OTHER HYPERLIPIDEMIA: ICD-10-CM

## 2025-06-13 DIAGNOSIS — Z00.00 ANNUAL PHYSICAL EXAM: Primary | ICD-10-CM

## 2025-06-13 DIAGNOSIS — R73.09 ABNORMAL GLUCOSE: ICD-10-CM

## 2025-06-13 DIAGNOSIS — I10 PRIMARY HYPERTENSION: ICD-10-CM

## 2025-06-13 DIAGNOSIS — K21.9 GASTROESOPHAGEAL REFLUX DISEASE, UNSPECIFIED WHETHER ESOPHAGITIS PRESENT: ICD-10-CM

## 2025-06-13 PROCEDURE — 99396 PREV VISIT EST AGE 40-64: CPT | Performed by: STUDENT IN AN ORGANIZED HEALTH CARE EDUCATION/TRAINING PROGRAM

## 2025-06-13 RX ORDER — VIBEGRON 75 MG/1
75 TABLET, FILM COATED ORAL DAILY
COMMUNITY
Start: 2024-11-26 | End: 2025-11-21

## 2025-06-13 NOTE — PROGRESS NOTES
"Chief Complaint  Annual Exam    Subjective    History of Present Illness  The patient is a 65-year-old female who presents for a physical exam.    She reports overall good health, with the exception of work-related stress due to recent changes in her environment. She has not been monitoring her blood pressure at home but believes it to be within normal limits. She is currently on a daily regimen of lisinopril 20 mg. Previous blood pressure readings include 146/94 six months ago and 132/82 prior to that.    She has a history of overactive bladder, which is currently managed with Gemtesa. She also experienced a skin condition related to moisture, which has since improved.    She is postmenopausal and is taking hormonal pills daily, including Econole, estradiol, and progesterone. Her last OB/GYN visit was in 01/2025, during which a Pap smear was performed. She is scheduled for a Pap smear every 3 years. She is due for colon cancer screening and has completed the necessary paperwork. She has expressed a preference for Dr. Rojelio Freire to perform the procedure.    She recently underwent a hearing test, which showed no changes. She occasionally experiences throat irritation due to allergies but reports no current symptoms. She does not use tobacco products. She reports no abdominal pain, open wounds on her feet, or sensations of numbness or tingling.    SOCIAL HISTORY  She does not smoke cigarettes.       Becka Stack presents to [facility]  History of Present Illness    Objective   Vital Signs:  /98 (BP Location: Left arm, Patient Position: Sitting, Cuff Size: Adult)   Pulse 88   Temp 98.4 °F (36.9 °C) (Oral)   Resp 16   Ht 172.7 cm (67.99\")   Wt 86.6 kg (191 lb)   SpO2 97%   BMI 29.05 kg/m²   Estimated body mass index is 29.05 kg/m² as calculated from the following:    Height as of this encounter: 172.7 cm (67.99\").    Weight as of this encounter: 86.6 kg (191 lb).    BMI is " >= 25 and <30. (Overweight) The following options were offered after discussion;: exercise counseling/recommendations and nutrition counseling/recommendations      Physical Exam  Cardiovascular:      Rate and Rhythm: Normal rate.      Pulses: Normal pulses.      Heart sounds: Normal heart sounds.   Pulmonary:      Effort: Pulmonary effort is normal.      Breath sounds: Normal breath sounds.   Abdominal:      General: Bowel sounds are normal.      Palpations: Abdomen is soft.   Neurological:      Mental Status: She is alert and oriented to person, place, and time.        Result Review :  The following data was reviewed by: Arnav Wolff MD on 06/13/2025:  CMP          11/18/2024    09:41 5/28/2025    09:17   CMP   Glucose 90  93    BUN 14  15.0    Creatinine 0.79  0.88    EGFR 84.7  74.4    Sodium 140  139    Potassium 4.7  4.9    Chloride 105  103    Calcium 9.3  9.3    Total Protein 7.2  7.1    Albumin 4.3  4.3    Globulin 2.9  2.8    Total Bilirubin 0.4  0.5    Alkaline Phosphatase 59  54    AST (SGOT) 22  23    ALT (SGPT) 13  17    Albumin/Globulin Ratio 1.5  1.5    BUN/Creatinine Ratio 17.7  17.0      CBC          5/28/2025    09:17   CBC   WBC 6.33    RBC 4.38    Hemoglobin 13.5    Hematocrit 40.9    MCV 93.4    MCH 30.8    MCHC 33.0    RDW 12.6    Platelets 281      Lipid Panel          11/18/2024    09:41 5/28/2025    09:17   Lipid Panel   Total Cholesterol 222  233    Triglycerides 135  168    HDL Cholesterol 67  72    VLDL Cholesterol 24  29    LDL Cholesterol  131  132      TSH          5/28/2025    09:17   TSH   TSH 3.220                Assessment and Plan   Diagnoses and all orders for this visit:    1. Annual physical exam (Primary)    2. Primary hypertension    3. Other hyperlipidemia    4. Abnormal glucose    5. Gastroesophageal reflux disease, unspecified whether esophagitis present        Assessment & Plan  1. Hypertension.  - Blood pressure readings have been consistently elevated, with today's  reading at 152/94 and a previous reading of 146/94 six months ago.  - Increased pain and limited mobility.  - Advised to monitor blood pressure daily for the next 4 weeks, ensuring relaxation during each reading. If more than half of the readings exceed 135/85, consider these as abnormal and schedule a follow-up appointment.  - Continue current medication regimen of lisinopril 20 mg daily. Bring a record of blood pressure readings for the two weeks preceding the next appointment in 6 months.    2. Overactive bladder.  - Currently on Gemtesa for overactive bladder with improved symptoms.  - No urinary symptoms reported, no changes to the current treatment plan necessary.  - Discussed the role of estrogen in preventing recurrent UTIs, but no treatment needed as there are no symptoms.  - Continue current medication regimen.    3. Health Maintenance.  - A1c level is 5.1, improved from the previous year.  - Kidney function, glucose, electrolytes, and liver function are all within normal limits.  - Cholesterol levels are stable with an LDL of 132, total cholesterol of 233, HDL of 72, and triglycerides of 168.  - Advised to maintain a healthy diet, engage in 30 minutes of walking and 15 minutes of strengthening exercises daily, and limit intake of red meat while increasing consumption of chicken and fish.    4. Preventive Care.  - Up-to-date with Pap smear screenings, last conducted on 01/23/2025.  - Advised to schedule a colonoscopy with Dr. Rojelio Freire.  - Mammogram was normal in 11/2024.    Follow-up  The patient will follow up in 6 months.   The 10-year ASCVD risk score (Madhu SCHUMACHER, et al., 2019) is: 7.1%    Values used to calculate the score:      Age: 65 years      Sex: Female      Is Non- : No      Diabetic: No      Tobacco smoker: No      Systolic Blood Pressure: 142 mmHg      Is BP treated: Yes      HDL Cholesterol: 72 mg/dL      Total Cholesterol: 233 mg/dL       Patient encouraged to  partake of healthy diet rich in fresh fruits and vegetables as well as lean proteins.  Patient encouraged to participate in daily exercise with goal of 30 min sustained activity.  Wear seatbelt when driving  Flu shot annually      Follow Up   No follow-ups on file.  Patient was given instructions and counseling regarding her condition or for health maintenance advice. Please see specific information pulled into the AVS if appropriate.     Patient or patient representative verbalized consent for the use of Ambient Listening during the visit with  Arnav Wolff MD for chart documentation. 6/29/2025  11:35 EDT